# Patient Record
Sex: MALE | Race: AMERICAN INDIAN OR ALASKA NATIVE
[De-identification: names, ages, dates, MRNs, and addresses within clinical notes are randomized per-mention and may not be internally consistent; named-entity substitution may affect disease eponyms.]

---

## 2020-09-19 ENCOUNTER — HOSPITAL ENCOUNTER (EMERGENCY)
Dept: HOSPITAL 5 - ED | Age: 30
Discharge: HOME | End: 2020-09-19
Payer: SELF-PAY

## 2020-09-19 VITALS — SYSTOLIC BLOOD PRESSURE: 123 MMHG | DIASTOLIC BLOOD PRESSURE: 87 MMHG

## 2020-09-19 DIAGNOSIS — Z79.899: ICD-10-CM

## 2020-09-19 DIAGNOSIS — K04.7: Primary | ICD-10-CM

## 2020-09-19 PROCEDURE — 99282 EMERGENCY DEPT VISIT SF MDM: CPT

## 2020-09-19 NOTE — EMERGENCY DEPARTMENT REPORT
ED ENT HPI





- General


Chief complaint: Dental/Oral


Stated complaint: SWOLLEN FACE


Source: patient


Mode of arrival: Ambulatory


Limitations: No Limitations





- History of Present Illness


Initial comments: 





This is a 30-year-old male presents to the emergency room complaining right side

facial swelling and toothache.  He woke up with his face swollen .  He denies 

any difficulty chewing or swallowing .he denies any past medical history and has

no known allergies.  He has not taken any medications at home for toothache.


MD complaint: tooth pain


-: Sudden


Location: tooth # (30)


Severity: moderate


Severity scale (0 -10): 8


Quality: aching, constant


Consistency: constant


Improves with: none


Worsens with: none


Context- Dental: history of dental caries, poor dental care


Associated Symptoms: other (FACIAL SWELLING).  denies: fever, cough, pain with 

swallowing, sore throat





- Related Data


                                  Previous Rx's











 Medication  Instructions  Recorded  Last Taken  Type


 


Acetaminophen/Codeine [Tylenol 1 tab PO Q6H PRN #15 tab 09/19/20 Unknown Rx





/Codeine # 3 tab]    


 


Amoxicillin [Trimox] 500 mg PO TID 10 Days #30 cap 09/19/20 Unknown Rx


 


Ibuprofen [Motrin] 600 mg PO Q8H PRN #20 tablet 09/19/20 Unknown Rx











                                    Allergies











Allergy/AdvReac Type Severity Reaction Status Date / Time


 


No Known Allergies Allergy   Unverified 09/19/20 07:52














ED Dental HPI





- General


Chief complaint: Dental/Oral


Stated complaint: SWOLLEN FACE


Source: patient


Mode of arrival: Ambulatory


Limitations: No Limitations





- History of Present Illness


MD complaint: tooth pain





- Related Data


                                  Previous Rx's











 Medication  Instructions  Recorded  Last Taken  Type


 


Acetaminophen/Codeine [Tylenol 1 tab PO Q6H PRN #15 tab 09/19/20 Unknown Rx





/Codeine # 3 tab]    


 


Amoxicillin [Trimox] 500 mg PO TID 10 Days #30 cap 09/19/20 Unknown Rx


 


Ibuprofen [Motrin] 600 mg PO Q8H PRN #20 tablet 09/19/20 Unknown Rx











                                    Allergies











Allergy/AdvReac Type Severity Reaction Status Date / Time


 


No Known Allergies Allergy   Unverified 09/19/20 07:52














ED Review of Systems


ROS: 


Stated complaint: SWOLLEN FACE


Other details as noted in HPI





Comment: All other systems reviewed and negative


Constitutional: denies: chills, fever


Eyes: denies: eye pain, eye discharge


ENT: dental pain, other (right side facial swelling).  denies: ear pain, throat 

pain, hearing loss


Respiratory: denies: cough, shortness of breath, SOB with exertion





ED Past Medical Hx





- Past Medical History


Previous Medical History?: No





- Surgical History


Past Surgical History?: No





- Medications


Home Medications: 


                                Home Medications











 Medication  Instructions  Recorded  Confirmed  Last Taken  Type


 


Acetaminophen/Codeine [Tylenol 1 tab PO Q6H PRN #15 tab 09/19/20  Unknown Rx





/Codeine # 3 tab]     


 


Amoxicillin [Trimox] 500 mg PO TID 10 Days #30 cap 09/19/20  Unknown Rx


 


Ibuprofen [Motrin] 600 mg PO Q8H PRN #20 tablet 09/19/20  Unknown Rx














ED Physical Exam





- General


Limitations: No Limitations


General appearance: alert, in no apparent distress





- Head


Head exam: Present: atraumatic





- Eye


Eye exam: Present: normal appearance.  Absent: conjunctival injection





- ENT


ENT exam: Present: mucous membranes moist, TM's normal bilaterally, other (right

 side facial swelling. Mild swelling arount teeth # 30. Multiple dental caries. 

Opens his mouth with ease)





- Neck


Neck exam: Present: full ROM.  Absent: lymphadenopathy





- Respiratory


Respiratory exam: Present: normal lung sounds bilaterally.  Absent: respiratory 

distress, wheezes





- Cardiovascular


Cardiovascular Exam: Present: regular rate, normal heart sounds





- Extremities Exam


Extremities exam: Present: normal inspection, full ROM





- Neurological Exam


Neurological exam: Present: alert, oriented X3





- Psychiatric


Psychiatric exam: Present: normal affect





- Skin


Skin exam: Present: warm, dry, intact, normal color





ED Course


                                   Vital Signs











  09/19/20 09/19/20





  08:12 09:23


 


Temperature 98.2 F 


 


Pulse Rate 71 


 


Respiratory 16 18





Rate  


 


Blood Pressure 123/87 





[Right]  


 


O2 Sat by Pulse 99 





Oximetry  














- Reevaluation(s)


Reevaluation #1: 





09/19/20 09:05


Motrin 800 mg p.o. given for pain





ED Medical Decision Making





- Medical Decision Making





This gentleman with known dental caries now with facial swelling to the right 

side of his jaw and mild swelling to tooth #30.  The plan is to discharge 

patient with emphasis on following up with a dentist as soon as possible.  

Patient prescribed amoxicillin 500 mg 3 times daily and Tylenol 3 for pain.


Critical Care Time: No


Critical care attestation.: 


If time is entered above; I have spent that time in minutes in the direct care 

of this critically ill patient, excluding procedure time.








ED Disposition


Clinical Impression: 


 Dental abscess





Disposition: DC-01 TO HOME OR SELFCARE


Is pt being admited?: No


Does the pt Need Aspirin: No


Condition: Stable


Instructions:  Dental Abscess (ED), Dental Caries (ED)


Additional Instructions: 


Warm salt water gargles at least 3 times a day.  Take amoxicillin until 

finished.  Take Tylenol 3 as prescribed and ibuprofen 600 mg as prescribed for 

pain follow-up with the dentist as soon as possible return to the emergency room

 for any difficulty swallowing difficulty opening your mouth or worsening 

swelling.


Prescriptions: 


Amoxicillin [Trimox] 500 mg PO TID 10 Days #30 cap


Ibuprofen [Motrin] 600 mg PO Q8H PRN #20 tablet


 PRN Reason: Pain


Acetaminophen/Codeine [Tylenol /Codeine # 3 tab] 1 tab PO Q6H PRN #15 tab


 PRN Reason: Pain, Moderate (4-6)


Time of Disposition: 09:10 (DENTAL REFERRAL GIVEN )


Print Language: ENGLISH